# Patient Record
Sex: MALE | Race: WHITE | Employment: UNEMPLOYED | URBAN - METROPOLITAN AREA
[De-identification: names, ages, dates, MRNs, and addresses within clinical notes are randomized per-mention and may not be internally consistent; named-entity substitution may affect disease eponyms.]

---

## 2024-06-17 ENCOUNTER — HOSPITAL ENCOUNTER (OUTPATIENT)
Dept: GENERAL RADIOLOGY | Age: 74
Discharge: HOME | End: 2024-06-17
Attending: PHYSICIAN ASSISTANT

## 2024-06-17 DIAGNOSIS — S05.11XA CONTUSION OF EYEBALL AND ORBITAL TISSUES, RIGHT EYE, INITIAL ENCOUNTER: ICD-10-CM

## 2024-06-17 PROCEDURE — 70200 X-RAY EXAM OF EYE SOCKETS: CPT | Performed by: PHYSICIAN ASSISTANT

## 2025-05-18 ENCOUNTER — HOSPITAL ENCOUNTER (OUTPATIENT)
Age: 75
Discharge: HOME | End: 2025-05-18
Payer: MEDICARE

## 2025-05-18 VITALS
HEART RATE: 61 BPM | SYSTOLIC BLOOD PRESSURE: 150 MMHG | DIASTOLIC BLOOD PRESSURE: 85 MMHG | OXYGEN SATURATION: 98 % | TEMPERATURE: 98 F | RESPIRATION RATE: 20 BRPM

## 2025-05-18 DIAGNOSIS — S40.811A ABRASION OF RIGHT UPPER EXTREMITY, INITIAL ENCOUNTER: Primary | ICD-10-CM

## 2025-05-18 RX ORDER — DENOSUMAB 60 MG/ML
INJECTION SUBCUTANEOUS
COMMUNITY

## 2025-05-18 RX ORDER — MESALAMINE 1.2 G/1
TABLET, DELAYED RELEASE ORAL
COMMUNITY
Start: 2024-07-11

## 2025-05-18 RX ORDER — TAMSULOSIN HYDROCHLORIDE 0.4 MG/1
CAPSULE ORAL
COMMUNITY
Start: 2024-08-06

## 2025-05-18 RX ORDER — ATENOLOL 25 MG/1
TABLET ORAL
COMMUNITY
Start: 2024-09-04

## 2025-05-18 RX ORDER — LOPERAMIDE HYDROCHLORIDE 2 MG/1
2 CAPSULE ORAL
COMMUNITY

## 2025-05-18 RX ORDER — TRIAMCINOLONE ACETONIDE 1 MG/G
CREAM TOPICAL
COMMUNITY
Start: 2025-03-11

## 2025-05-18 RX ORDER — OLMESARTAN MEDOXOMIL 40 MG/1
TABLET ORAL
COMMUNITY

## 2025-05-18 RX ORDER — SPIRONOLACTONE 25 MG/1
25 TABLET ORAL DAILY
COMMUNITY
Start: 2024-12-08

## 2025-05-18 RX ORDER — FINASTERIDE 5 MG/1
TABLET, FILM COATED ORAL
COMMUNITY
Start: 2024-09-16

## 2025-05-18 RX ORDER — ROSUVASTATIN CALCIUM 10 MG/1
10 TABLET, COATED ORAL AS DIRECTED
COMMUNITY

## 2025-05-18 RX ORDER — LEVOFLOXACIN 500 MG/1
TABLET, FILM COATED ORAL
COMMUNITY
Start: 2025-01-31

## 2025-05-18 RX ORDER — ATENOLOL 25 MG/1
25 TABLET ORAL AS DIRECTED
COMMUNITY

## 2025-05-18 RX ORDER — TAMSULOSIN HYDROCHLORIDE 0.4 MG/1
0.4 CAPSULE ORAL AS DIRECTED
COMMUNITY

## 2025-05-18 RX ORDER — ROSUVASTATIN CALCIUM 10 MG/1
TABLET, COATED ORAL
COMMUNITY
Start: 2024-11-08

## 2025-05-18 RX ORDER — SENNOSIDES 8.6 MG
650 CAPSULE ORAL EVERY 8 HOURS
COMMUNITY

## 2025-05-18 RX ORDER — LISINOPRIL 20 MG/1
TABLET ORAL
COMMUNITY
Start: 2024-12-04

## 2025-05-18 RX ORDER — AMILORIDE HYDROCHLORIDE 5 MG/1
TABLET ORAL
COMMUNITY
Start: 2025-01-09 | End: 2025-08-26

## 2025-05-18 NOTE — ED PROVIDER NOTES
Patient Seen in: [facility]      History     Chief Complaint   Patient presents with    Laceration/Abrasion     Stated Complaint: Laceration    Subjective:   HPI  History of Present Illness     Patient is a 75-year-old male who presents to the immediate care center with concern for injury to the right forearm.  He was walking through a doorway 2 days ago when his arm accidentally struck a piece of metal protruding from the right side of the door.  He presumed to be a minor injury at that time and has been cleaning and dressing it with Neosporin ointment.  He is here today concerned because there was serosanguineous drainage (not purulent) this morning.  Scheduled surgery next week and he wants to ensure that this is not an infection that would complicate his surgical plans.              Objective:     Past Medical History:    Essential hypertension    Hyperlipidemia              No pertinent past surgical history.              No pertinent social history.            Review of Systems   Constitutional:  Negative for chills and fever.   Skin:  Positive for wound.   Neurological:  Negative for weakness and numbness.       Positive for stated complaint: Laceration  Other systems are as noted in HPI.  Constitutional and vital signs reviewed.      All other systems reviewed and negative except as noted above.                  Physical Exam     ED Triage Vitals [05/18/25 1057]   /85   Pulse 61   Resp 20   Temp 97.7 °F (36.5 °C)   Temp src Oral   SpO2 98 %   O2 Device None (Room air)       Current Vitals:   Vital Signs  BP: 150/85  Pulse: 61  Resp: 20  Temp: 97.7 °F (36.5 °C)  Temp src: Oral    Oxygen Therapy  SpO2: 98 %  O2 Device: None (Room air)          Physical Exam  Vitals and nursing note reviewed.   Constitutional:       General: He is not in acute distress.     Appearance: He is not ill-appearing.   Cardiovascular:      Pulses: Normal pulses.   Skin:     General: Skin is warm and dry.           Neurological:      Mental Status: He is alert and oriented to person, place, and time.   Psychiatric:         Behavior: Behavior normal.                   ED Course   Labs Reviewed - No data to display       Results                              MDM      Patient was reassured that there is no evidence of infection at time of evaluation today.    We discussed empiric antibiotics.  There is some risk that the antibiotics alone may cause complication that could jeopardize his surgical plans.  For these reasons, he was encouraged to clean the area twice daily with soap and water, continue to use Neosporin ointment to prophylax against infection.            Medical Decision Making  Differential diagnoses considered today include, but are not exclusive of: Abrasion, laceration, cellulitis.    Problems Addressed:  Abrasion of right upper extremity, initial encounter: self-limited or minor problem    Risk  OTC drugs.          Disposition and Plan     Clinical Impression:  1. Abrasion of right upper extremity, initial encounter         Disposition:  Discharge  5/18/2025 11:38 am    Follow-up:  Your primary care provider  Call to schedule appointment to be seen in 5-7 days.    As needed          Medications Prescribed:  Discharge Medication List as of 5/18/2025 11:39 AM                Supplementary Documentation:

## 2025-05-18 NOTE — ED INITIAL ASSESSMENT (HPI)
Pt came in for wound eval on right forearm. Pt stated he accidentally hit his right forearm on a door causing a superficial abrasion 2 days ago. Pt concerned for possible infection. Pt stated he noticed drainage from wound. Pt has easy non labored respirations.

## 2025-08-03 ENCOUNTER — HOSPITAL ENCOUNTER (OUTPATIENT)
Age: 75
Discharge: HOME | End: 2025-08-03

## 2025-08-03 VITALS
SYSTOLIC BLOOD PRESSURE: 119 MMHG | RESPIRATION RATE: 20 BRPM | DIASTOLIC BLOOD PRESSURE: 65 MMHG | OXYGEN SATURATION: 100 % | HEART RATE: 65 BPM | TEMPERATURE: 98 F

## 2025-08-03 DIAGNOSIS — N39.0 COMPLICATED UTI (URINARY TRACT INFECTION): Primary | ICD-10-CM

## 2025-08-03 LAB
BILIRUB UR QL STRIP: NEGATIVE
COLOR UR: YELLOW
GLUCOSE UR STRIP-MCNC: NEGATIVE MG/DL
KETONES UR STRIP-MCNC: NEGATIVE MG/DL
NITRITE UR QL STRIP: NEGATIVE
PH UR STRIP: 7
PROT UR STRIP-MCNC: NEGATIVE MG/DL
SP GR UR STRIP: 1.01
UROBILINOGEN UR STRIP-ACNC: <2 MG/DL

## 2025-08-03 RX ORDER — CIPROFLOXACIN 500 MG/1
500 TABLET, FILM COATED ORAL 2 TIMES DAILY
Qty: 14 TABLET | Refills: 0 | Status: SHIPPED | OUTPATIENT
Start: 2025-08-03 | End: 2025-08-10